# Patient Record
Sex: MALE | Race: BLACK OR AFRICAN AMERICAN | NOT HISPANIC OR LATINO | Employment: FULL TIME | URBAN - METROPOLITAN AREA
[De-identification: names, ages, dates, MRNs, and addresses within clinical notes are randomized per-mention and may not be internally consistent; named-entity substitution may affect disease eponyms.]

---

## 2020-03-19 ENCOUNTER — OFFICE VISIT (OUTPATIENT)
Dept: UROLOGY | Facility: CLINIC | Age: 25
End: 2020-03-19

## 2020-03-19 VITALS — SYSTOLIC BLOOD PRESSURE: 110 MMHG | RESPIRATION RATE: 16 BRPM | HEART RATE: 60 BPM | DIASTOLIC BLOOD PRESSURE: 75 MMHG

## 2020-03-19 DIAGNOSIS — N43.3 RIGHT HYDROCELE: ICD-10-CM

## 2020-03-19 DIAGNOSIS — N50.819 TESTICLE PAIN: Primary | ICD-10-CM

## 2020-03-19 PROCEDURE — 99203 OFFICE O/P NEW LOW 30 MIN: CPT | Mod: 25,S$GLB,, | Performed by: NURSE PRACTITIONER

## 2020-03-19 PROCEDURE — 99203 PR OFFICE/OUTPT VISIT, NEW, LEVL III, 30-44 MIN: ICD-10-PCS | Mod: 25,S$GLB,, | Performed by: NURSE PRACTITIONER

## 2020-03-19 PROCEDURE — 96372 PR INJECTION,THERAP/PROPH/DIAG2ST, IM OR SUBCUT: ICD-10-PCS | Mod: S$GLB,,, | Performed by: NURSE PRACTITIONER

## 2020-03-19 PROCEDURE — 96372 THER/PROPH/DIAG INJ SC/IM: CPT | Mod: S$GLB,,, | Performed by: NURSE PRACTITIONER

## 2020-03-19 RX ORDER — LEVETIRACETAM 500 MG/1
TABLET, EXTENDED RELEASE ORAL DAILY
COMMUNITY

## 2020-03-19 RX ORDER — KETOROLAC TROMETHAMINE 10 MG/1
10 TABLET, FILM COATED ORAL EVERY 6 HOURS PRN
Qty: 12 TABLET | Refills: 1 | Status: SHIPPED | OUTPATIENT
Start: 2020-03-19

## 2020-03-19 RX ORDER — KETOROLAC TROMETHAMINE 30 MG/ML
15 INJECTION, SOLUTION INTRAMUSCULAR; INTRAVENOUS
Status: COMPLETED | OUTPATIENT
Start: 2020-03-19 | End: 2020-03-19

## 2020-03-19 RX ADMIN — KETOROLAC TROMETHAMINE 15 MG: 30 INJECTION, SOLUTION INTRAMUSCULAR; INTRAVENOUS at 02:03

## 2020-03-19 NOTE — PROGRESS NOTES
IM RIGHT VG. PATIENT TOLERATED WELL. PATIENT WAITED 15 MINUTES TO ELIMINATE ANY ALLERGIC REACTION.

## 2020-03-19 NOTE — PROGRESS NOTES
This is an addendum to the note placed by the practitioner.    24-year-old  presenting with testicular pain that has been on off for several but worse the past several days.  Reports that he has been doing some heavy lifting at work recently.  He does not wet tied undergarments.  Pain is all localized in the scrotum is not radiating to any other parts of his body not even to the lower abdomen.  He was treated with anti-inflammatory medications in emergency setting.  Ultrasound and CT scans were done denies show any gross abnormalities.  Was referred to us for further management.     We did examine patient he has testicles and aligning low in the scrotal sac there is diffuse aching of the testicles bilaterally some areas of more achiness others.  He has a very strong cremasteric reflex    Assessment:  Bilateral testicular orchalgia  Plan:  We will recommend wearing tight undergarments.  I will recommend anti-inflammatory medications as well.  We gave him 30 mg of Toradol IM today and see if that works for him if he had a does work for him that he will need some Toradol pills.  We will reassess him several weeks from now.

## 2020-03-19 NOTE — PROGRESS NOTES
Chief Complaint:   Chief Complaint   Patient presents with    Testicle Pain     Testicular pain and edema. Referred per [facility] ER.       HPI:  24-year-old  male who presents for establishment of care.  Patient initially presented to [facility] ER on 3/16/2020 with complaints of testicular pain.  CT abdomen/pelvis without contrast performed, revealing no acute abnormalities.  Scrotal ultrasound revealed minimal right hydrocele, bilateral testicular blood flow present, no testicular mass, no significant varicocele, normal epididymis noted bilaterally.  Patient was given NSAIDs and instructed to follow-up with urology.  Today, he reports that the pain was intermittent but is now constant for the past 3 days, severe, with radiation in to lower abdomen as well as down bilateral legs, described as shock, worse with movement, and causing concern.  He denies any burning with urination, urinary urgency, urinary frequency, hematuria, penile lesions, penile discharge, fever, chills, or night sweats.    Allergies:  Patient has no known allergies.    Medications:  has a current medication list which includes the following prescription(s): insulin glargine,hum.rec.anlog, insulin lispro protamin/lispro, levetiracetam xr, and ketorolac, and the following Facility-Administered Medications: ketorolac.    Review of Systems:  General: No fever, chills, vision changes, dizziness, weakness, fatigue, unexplained weight loss, confusion, or mood swings.  Skin: No rashes, itching, or changes in color/texture of skin.  Chest: Denies [place], cyanosis, wheezing, cough, and sputum production.  Abdomen: Appetite fine. Denies diarrhea, abdominal pain, hematemesis, or blood in stool.  Musculoskeletal: No joint stiffness or swelling. Denies back pain.  : As above.  All other review of systems negative.    PMH:   has a past medical history of Diabetes and Seizure.    PSH:   has no past surgical history on file.    FamHx:  family history is not on file.    SocHx:  reports that he has been smoking cigarettes. He has never used smokeless tobacco. He reports that he drank alcohol. His drug history is not on file.      Physical Exam:  Vitals:    03/19/20 1334   BP: 110/75   Pulse: 60   Resp: 16     General: AAOx3, no apparent distress, no deformities, thin  Neck: supple, no masses, normal thyroid, full ROM  Lungs: CTAB, no adventitious breath sounds, normal inspiration, no use of accessory muscles  Heart: regular rate and rhythm, no arrhythmias  Abdomen: soft, NT, ND, no masses, no hernias, no hepatosplenomegaly  Lymphatic: no unusually enlarged or tender lymph nodes  Skin: warm and dry, no jaundice, no rash  Ext: without edema or deformity.  : test desc terese, no abnormalities of epididymus. Penis circumscribed, with normal penile and scrotal skin. Meatus normal. No lesions. Scrotal sack hanging very low without supportive underwear noted. Significant tenderness to touch noted with light palpation of both testicles, scrotum, and penis. **exam performed by Dr. Rasmussen    Labs/Studies: ER record reviewed, imaging results noted above    Impression/Plan:   Testicle pain  Comments:  likely related to non-supportive undergarments as terese testicles tender to light palpation, will give anti-inflammatories, change underwear type, & re-eval   Orders:  -     ketorolac injection 15 mg  -     ketorolac (TORADOL) 10 mg tablet; Take 1 tablet (10 mg total) by mouth every 6 (six) hours as needed for Pain.  Dispense: 12 tablet; Refill: 1    Right hydrocele  Comments:  minimal, as noted on recent US imaging    **pain improved after IM injection of Toradol so will send pills to pharmacy**    Follow up in about 4 weeks (around 4/16/2020) for re-eval of symptoms.

## 2020-03-19 NOTE — LETTER
March 19, 2020      [facility]  [address] [address]  Phone: [phone]  Fax: [phone]       Patient: Lucia Chauhan   YOB: 1995  Date of Visit: 03/19/2020    To Whom It May Concern:    Nikolas Chauhan  was at [facility] on 03/19/2020. He may return to work on 3/20/20 with no restrictions. If you have any questions or concerns, or if I can be of further assistance, please do not hesitate to contact me.    Sincerely,    Marry Tavares LPN